# Patient Record
Sex: MALE | Race: WHITE | NOT HISPANIC OR LATINO | URBAN - METROPOLITAN AREA
[De-identification: names, ages, dates, MRNs, and addresses within clinical notes are randomized per-mention and may not be internally consistent; named-entity substitution may affect disease eponyms.]

---

## 2022-07-21 ENCOUNTER — CLAIMS CREATED FROM THE CLAIM WINDOW (OUTPATIENT)
Dept: URBAN - METROPOLITAN AREA CLINIC 54 | Facility: CLINIC | Age: 77
End: 2022-07-21
Payer: MEDICARE

## 2022-07-21 ENCOUNTER — WEB ENCOUNTER (OUTPATIENT)
Dept: URBAN - METROPOLITAN AREA CLINIC 54 | Facility: CLINIC | Age: 77
End: 2022-07-21

## 2022-07-21 VITALS
SYSTOLIC BLOOD PRESSURE: 155 MMHG | DIASTOLIC BLOOD PRESSURE: 74 MMHG | TEMPERATURE: 97.2 F | HEART RATE: 57 BPM | BODY MASS INDEX: 25.62 KG/M2 | WEIGHT: 179 LBS | HEIGHT: 70 IN

## 2022-07-21 DIAGNOSIS — Z86.2 HISTORY OF THROMBOCYTOPENIA: ICD-10-CM

## 2022-07-21 DIAGNOSIS — R03.0 ELEVATED BP WITHOUT DIAGNOSIS OF HYPERTENSION: ICD-10-CM

## 2022-07-21 DIAGNOSIS — R93.2 ABNORMAL LIVER ULTRASOUND: ICD-10-CM

## 2022-07-21 PROCEDURE — 91200 LIVER ELASTOGRAPHY: CPT

## 2022-07-21 PROCEDURE — 99204 OFFICE O/P NEW MOD 45 MIN: CPT

## 2022-07-21 PROCEDURE — 99244 OFF/OP CNSLTJ NEW/EST MOD 40: CPT

## 2022-07-21 RX ORDER — CITALOPRAM 20 MG/1
1 TABLET TABLET, FILM COATED ORAL ONCE A DAY
Status: ACTIVE | COMMUNITY

## 2022-07-21 NOTE — HPI-TODAY'S VISIT:
7/21/22: Patient was referred by Dr. Eddie Negro for abnormal liver US. A copy of this document will be sent to the physician. Pt is a healthy 77 yo male with PMH of HLD, IBS, and thrombocytopenia who presents for abnormal liver US. Feeling well overall with. Does reports infrequent, mild RUQ tenderness for the last year. Denies ascites, LE edema, jaundice, PSE, easy bruising/bleeding, nausea, vomiting, change in BMs, hematochezia, or diarrhea. Appetite is normal and pt has no unintentional weight loss. Liver enzymes are normal. Pt was told he had may have had a Hep C infection in the past, but did not have Hep C. Pt does not drink alcohol or use tobacco products. Does not overuse Tylenol. Takes several herbals - juice plus, saw palmetto, pygeum, CoQ 10, milk thistle, red yeast rice, and spirulina. Last cscope was 2020 to repeat in 5 years.  Labs 6/22/22: AST 22, ALT 13, ALP 89, TB 1.0, albumin 3.9. Most recent Plt count was 141 from 2/8/22.  RUQ US 7/8/22: Surgically absent gallbladder. Coarsened hepatic echotexture with increased echogenicity as described.

## 2022-07-26 LAB
A/G RATIO: 1.9
ABSOLUTE BASOPHILS: 38
ABSOLUTE EOSINOPHILS: 0
ABSOLUTE LYMPHOCYTES: 646
ABSOLUTE MONOCYTES: 418
ABSOLUTE NEUTROPHILS: 2698
ACTIN (SMOOTH MUSCLE) ANTIBODY (IGG): <20
AFP, SERUM, TUMOR MARKER: 2.8
ALBUMIN: 4.5
ALKALINE PHOSPHATASE: 80
ALT (SGPT): 11
ANA SCREEN, IFA: NEGATIVE
AST (SGOT): 18
BASOPHILS: 1
BILIRUBIN, TOTAL: 0.9
BUN/CREATININE RATIO: (no result)
BUN: 11
CALCIUM: 9.4
CARBON DIOXIDE, TOTAL: 28
CERULOPLASMIN: 26
CHLORIDE: 101
CREATININE: 0.81
EGFR: 93
EOSINOPHILS: 0
FERRITIN, SERUM: 83
GLOBULIN, TOTAL: 2.4
GLUCOSE: 93
HBSAG SCREEN: (no result)
HEMATOCRIT: 45.4
HEMOGLOBIN: 15.5
HEP A AB, IGM: (no result)
HEP B CORE AB, IGM: (no result)
HEP C VIRUS AB: 0.01
HEPATITIS C ANTIBODY: (no result)
IMMUNOGLOBULIN A, QN, SERUM: 277
INR: 1
IRON BIND.CAP.(TIBC): 324
IRON SATURATION: 41
IRON: 132
LYMPHOCYTES: 17
MCH: 31.1
MCHC: 34.1
MCV: 91.2
MITOCHONDRIAL (M2) ANTIBODY: (no result)
MONOCYTES: 11
MPV: 11.7
NEUTROPHILS: 71
NOTE: (no result)
PLATELET COUNT: 129
POTASSIUM: 4.6
PROTEIN, TOTAL: 6.9
PT: 10.3
RDW: 12.3
RED BLOOD CELL COUNT: 4.98
RHEUMATOID FACTOR: <14
SJOGREN'S ANTIBODY (SS-A): (no result)
SJOGREN'S ANTIBODY (SS-B): (no result)
SODIUM: 136
WHITE BLOOD CELL COUNT: 3.8

## 2022-08-05 ENCOUNTER — TELEPHONE ENCOUNTER (OUTPATIENT)
Dept: URBAN - METROPOLITAN AREA CLINIC 118 | Facility: CLINIC | Age: 77
End: 2022-08-05

## 2022-09-12 ENCOUNTER — DASHBOARD ENCOUNTERS (OUTPATIENT)
Age: 77
End: 2022-09-12

## 2022-09-12 ENCOUNTER — OFFICE VISIT (OUTPATIENT)
Dept: URBAN - METROPOLITAN AREA CLINIC 54 | Facility: CLINIC | Age: 77
End: 2022-09-12
Payer: MEDICARE

## 2022-09-12 VITALS
BODY MASS INDEX: 25.77 KG/M2 | TEMPERATURE: 97.6 F | SYSTOLIC BLOOD PRESSURE: 141 MMHG | WEIGHT: 180 LBS | HEIGHT: 70 IN | DIASTOLIC BLOOD PRESSURE: 74 MMHG | HEART RATE: 52 BPM

## 2022-09-12 DIAGNOSIS — Z86.2 HISTORY OF THROMBOCYTOPENIA: ICD-10-CM

## 2022-09-12 DIAGNOSIS — K76.0 FATTY LIVER: ICD-10-CM

## 2022-09-12 DIAGNOSIS — R93.2 ABNORMAL LIVER ULTRASOUND: ICD-10-CM

## 2022-09-12 DIAGNOSIS — R03.0 ELEVATED BP WITHOUT DIAGNOSIS OF HYPERTENSION: ICD-10-CM

## 2022-09-12 PROBLEM — 197321007: Status: ACTIVE | Noted: 2022-09-12

## 2022-09-12 PROBLEM — 49341000119108: Status: ACTIVE | Noted: 2022-07-21

## 2022-09-12 PROCEDURE — 99214 OFFICE O/P EST MOD 30 MIN: CPT | Performed by: INTERNAL MEDICINE

## 2022-09-12 RX ORDER — CITALOPRAM 20 MG/1
1 TABLET TABLET, FILM COATED ORAL ONCE A DAY
Status: ACTIVE | COMMUNITY

## 2022-09-12 NOTE — PHYSICAL EXAM CHEST:
no lesions,  no deformities,  no traumatic injuries,  mild spiders on chest no significant scars are present,  chest wall non-tender,  no masses present, breathing is unlabored without accessory muscle use,normal breath sounds

## 2022-09-12 NOTE — HPI-TODAY'S VISIT:
7/21/22: Patient was referred by Dr. Eddie Negro for abnormal liver US. A copy of this document will be sent to the physician. Pt is a healthy 75 yo male with PMH of HLD, IBS, and thrombocytopenia who presents for abnormal liver US. Feeling well overall with. Does reports infrequent, mild RUQ tenderness for the last year. Denies ascites, LE edema, jaundice, PSE, easy bruising/bleeding, nausea, vomiting, change in BMs, hematochezia, or diarrhea. Appetite is normal and pt has no unintentional weight loss. Liver enzymes are normal. Pt was told he had may have had a Hep C infection in the past, but did not have Hep C. Pt does not drink alcohol or use tobacco products. Does not overuse Tylenol. Takes several herbals - juice plus, saw palmetto, pygeum, CoQ 10, milk thistle, red yeast rice, and spirulina. Last cscope was 2020 to repeat in 5 years.  Labs 6/22/22: AST 22, ALT 13, ALP 89, TB 1.0, albumin 3.9. Most recent Plt count was 141 from 2/8/22.  RUQ US 7/8/22: Surgically absent gallbladder. Coarsened hepatic echotexture with increased echogenicity as described.   Follow Up 9/12/22: Patient presents for routine follow up. Liver secondary workup was negative. PLTs noted at 129. 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 He has lost 7 lbs intentionally. He has a mild RUQ discomfort. He denies HTN but has borderline HLD.

## 2022-10-11 ENCOUNTER — TELEPHONE ENCOUNTER (OUTPATIENT)
Dept: URBAN - METROPOLITAN AREA CLINIC 54 | Facility: CLINIC | Age: 77
End: 2022-10-11

## 2023-03-29 ENCOUNTER — TELEPHONE ENCOUNTER (OUTPATIENT)
Dept: URBAN - METROPOLITAN AREA CLINIC 54 | Facility: CLINIC | Age: 78
End: 2023-03-29

## 2024-12-12 ENCOUNTER — LAB OUTSIDE AN ENCOUNTER (OUTPATIENT)
Dept: URBAN - METROPOLITAN AREA CLINIC 54 | Facility: CLINIC | Age: 79
End: 2024-12-12

## 2024-12-12 ENCOUNTER — OFFICE VISIT (OUTPATIENT)
Dept: URBAN - METROPOLITAN AREA CLINIC 54 | Facility: CLINIC | Age: 79
End: 2024-12-12
Payer: MEDICARE

## 2024-12-12 VITALS
HEIGHT: 70 IN | BODY MASS INDEX: 26.63 KG/M2 | DIASTOLIC BLOOD PRESSURE: 76 MMHG | WEIGHT: 186 LBS | TEMPERATURE: 97.4 F | HEART RATE: 52 BPM | SYSTOLIC BLOOD PRESSURE: 151 MMHG

## 2024-12-12 DIAGNOSIS — Z86.0100 HISTORY OF COLON POLYPS: ICD-10-CM

## 2024-12-12 DIAGNOSIS — Z86.2 HISTORY OF THROMBOCYTOPENIA: ICD-10-CM

## 2024-12-12 DIAGNOSIS — K76.0 FATTY LIVER: ICD-10-CM

## 2024-12-12 DIAGNOSIS — Z80.0 FAMILY HISTORY OF COLON CANCER: ICD-10-CM

## 2024-12-12 DIAGNOSIS — I48.91 ATRIAL FIBRILLATION, UNSPECIFIED TYPE: ICD-10-CM

## 2024-12-12 PROBLEM — 49436004: Status: ACTIVE | Noted: 2024-12-12

## 2024-12-12 PROCEDURE — 99213 OFFICE O/P EST LOW 20 MIN: CPT

## 2024-12-12 RX ORDER — SOD SULF/POT CHLORIDE/MAG SULF 1.479 G
12 TABLETS THE FIRST DOSE THE EVENING BEFORE AND SECOND DOSE THE MORNING OF COLONOSCOPY TABLET ORAL TWICE A DAY
Qty: 24 | Refills: 0 | OUTPATIENT
Start: 2024-12-12 | End: 2024-12-13

## 2024-12-12 RX ORDER — APIXABAN 5 MG/1
AS DIRECTED TABLET, FILM COATED ORAL
Status: ACTIVE | COMMUNITY

## 2024-12-12 RX ORDER — CITALOPRAM 20 MG/1
1 TABLET TABLET, FILM COATED ORAL ONCE A DAY
Status: ACTIVE | COMMUNITY

## 2024-12-12 RX ORDER — TADALAFIL 5 MG/1
1 TABLET AS NEEDED TABLET, FILM COATED ORAL ONCE A DAY
Status: ACTIVE | COMMUNITY

## 2024-12-12 RX ORDER — FLECAINIDE ACETATE 50 MG/1
AS DIRECTED TABLET ORAL
Status: ACTIVE | COMMUNITY

## 2024-12-12 NOTE — HPI-TODAY'S VISIT:
7/21/22: Patient was referred by Dr. Eddie Negro for abnormal liver US. A copy of this document will be sent to the physician. Pt is a healthy 75 yo male with PMH of HLD, IBS, and thrombocytopenia who presents for abnormal liver US. Feeling well overall with. Does reports infrequent, mild RUQ tenderness for the last year. Denies ascites, LE edema, jaundice, PSE, easy bruising/bleeding, nausea, vomiting, change in BMs, hematochezia, or diarrhea. Appetite is normal and pt has no unintentional weight loss. Liver enzymes are normal. Pt was told he had may have had a Hep C infection in the past, but did not have Hep C. Pt does not drink alcohol or use tobacco products. Does not overuse Tylenol. Takes several herbals - juice plus, saw palmetto, pygeum, CoQ 10, milk thistle, red yeast rice, and spirulina. Last cscope was 2020 to repeat in 5 years.  Labs 6/22/22: AST 22, ALT 13, ALP 89, TB 1.0, albumin 3.9. Most recent Plt count was 141 from 2/8/22.  RUQ US 7/8/22: Surgically absent gallbladder. Coarsened hepatic echotexture with increased echogenicity as described.   Follow Up 9/12/22: Patient presents for routine follow up. Liver secondary workup was negative. PLTs noted at 129. 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 He has lost 7 lbs intentionally. He has a mild RUQ discomfort. He denies HTN but has borderline HLD.  12/12/24: Patient RTC for surveillance colonoscopy. Last cscope was negative in Jan 2020, but he has a hx of colon polyps and a family hx of CRC in his brother. Stools have gotten harder recently. Otherwise no complaints. Denies hematochezia, melena, abd pain, or weight loss. On Eliquis for Afib.  Colonoscopy 1/3/2020: - Internal hemorrhoids. - Diverticulosis in the sigmoid colon, in the descending colon, at the splenic flexure, in the transversecolon and at the hepatic flexure. - The examined portion of the ileum was normal. - The examination was otherwise normal. - No specimens collected

## 2025-01-15 ENCOUNTER — TELEPHONE ENCOUNTER (OUTPATIENT)
Dept: URBAN - METROPOLITAN AREA CLINIC 54 | Facility: CLINIC | Age: 80
End: 2025-01-15

## 2025-02-06 ENCOUNTER — CLAIMS CREATED FROM THE CLAIM WINDOW (OUTPATIENT)
Dept: URBAN - METROPOLITAN AREA SURGERY CENTER 14 | Facility: SURGERY CENTER | Age: 80
End: 2025-02-06
Payer: MEDICARE

## 2025-02-06 ENCOUNTER — CLAIMS CREATED FROM THE CLAIM WINDOW (OUTPATIENT)
Dept: URBAN - METROPOLITAN AREA CLINIC 4 | Facility: CLINIC | Age: 80
End: 2025-02-06
Payer: MEDICARE

## 2025-02-06 DIAGNOSIS — D12.2 BENIGN NEOPLASM OF ASCENDING COLON: ICD-10-CM

## 2025-02-06 DIAGNOSIS — Z80.0 FAMILY HISTORY OF COLON CANCER: ICD-10-CM

## 2025-02-06 DIAGNOSIS — K57.30 COLONIC DIVERTICULOSIS: ICD-10-CM

## 2025-02-06 DIAGNOSIS — Z12.11 ENCOUNTER FOR SCREENING FOR MALIGNANT NEOPLASM OF COLON: ICD-10-CM

## 2025-02-06 DIAGNOSIS — Z12.11 COLON CANCER SCREENING: ICD-10-CM

## 2025-02-06 DIAGNOSIS — D12.2 ADENOMA OF ASCENDING COLON: ICD-10-CM

## 2025-02-06 PROCEDURE — 00811 ANES LWR INTST NDSC NOS: CPT | Performed by: NURSE ANESTHETIST, CERTIFIED REGISTERED

## 2025-02-06 PROCEDURE — 88305 TISSUE EXAM BY PATHOLOGIST: CPT | Performed by: PATHOLOGY

## 2025-02-06 PROCEDURE — 45380 COLONOSCOPY AND BIOPSY: CPT | Performed by: CLINIC/CENTER

## 2025-02-06 PROCEDURE — 45380 COLONOSCOPY AND BIOPSY: CPT | Performed by: INTERNAL MEDICINE

## 2025-02-06 RX ORDER — CITALOPRAM 20 MG/1
1 TABLET TABLET, FILM COATED ORAL ONCE A DAY
Status: ACTIVE | COMMUNITY

## 2025-02-06 RX ORDER — TADALAFIL 5 MG/1
1 TABLET AS NEEDED TABLET, FILM COATED ORAL ONCE A DAY
Status: ACTIVE | COMMUNITY

## 2025-02-06 RX ORDER — APIXABAN 5 MG/1
AS DIRECTED TABLET, FILM COATED ORAL
Status: ACTIVE | COMMUNITY

## 2025-02-06 RX ORDER — FLECAINIDE ACETATE 50 MG/1
AS DIRECTED TABLET ORAL
Status: ACTIVE | COMMUNITY

## 2025-02-17 ENCOUNTER — OFFICE VISIT (OUTPATIENT)
Dept: URBAN - METROPOLITAN AREA CLINIC 54 | Facility: CLINIC | Age: 80
End: 2025-02-17
Payer: MEDICARE

## 2025-02-17 VITALS
BODY MASS INDEX: 26.6 KG/M2 | TEMPERATURE: 98.4 F | WEIGHT: 185.8 LBS | HEIGHT: 70 IN | DIASTOLIC BLOOD PRESSURE: 83 MMHG | SYSTOLIC BLOOD PRESSURE: 144 MMHG | HEART RATE: 54 BPM

## 2025-02-17 DIAGNOSIS — K76.0 FATTY LIVER: ICD-10-CM

## 2025-02-17 DIAGNOSIS — I48.91 ATRIAL FIBRILLATION, UNSPECIFIED TYPE: ICD-10-CM

## 2025-02-17 DIAGNOSIS — Z86.2 HISTORY OF THROMBOCYTOPENIA: ICD-10-CM

## 2025-02-17 DIAGNOSIS — Z80.0 FAMILY HISTORY OF COLON CANCER: ICD-10-CM

## 2025-02-17 DIAGNOSIS — Z86.0100 HISTORY OF COLON POLYPS: ICD-10-CM

## 2025-02-17 PROCEDURE — 99212 OFFICE O/P EST SF 10 MIN: CPT

## 2025-02-17 RX ORDER — TADALAFIL 5 MG/1
1 TABLET AS NEEDED TABLET, FILM COATED ORAL ONCE A DAY
Status: ACTIVE | COMMUNITY

## 2025-02-17 RX ORDER — APIXABAN 5 MG/1
AS DIRECTED TABLET, FILM COATED ORAL
Status: ACTIVE | COMMUNITY

## 2025-02-17 RX ORDER — FLECAINIDE ACETATE 50 MG/1
AS DIRECTED TABLET ORAL
Status: ACTIVE | COMMUNITY

## 2025-02-17 RX ORDER — CITALOPRAM 20 MG/1
1 TABLET TABLET, FILM COATED ORAL ONCE A DAY
Status: ACTIVE | COMMUNITY

## 2025-02-17 NOTE — HPI-TODAY'S VISIT:
7/21/22: Patient was referred by Dr. Eddie Negro for abnormal liver US. A copy of this document will be sent to the physician. Pt is a healthy 77 yo male with PMH of HLD, IBS, and thrombocytopenia who presents for abnormal liver US. Feeling well overall with. Does reports infrequent, mild RUQ tenderness for the last year. Denies ascites, LE edema, jaundice, PSE, easy bruising/bleeding, nausea, vomiting, change in BMs, hematochezia, or diarrhea. Appetite is normal and pt has no unintentional weight loss. Liver enzymes are normal. Pt was told he had may have had a Hep C infection in the past, but did not have Hep C. Pt does not drink alcohol or use tobacco products. Does not overuse Tylenol. Takes several herbals - juice plus, saw palmetto, pygeum, CoQ 10, milk thistle, red yeast rice, and spirulina. Last cscope was 2020 to repeat in 5 years.  Labs 6/22/22: AST 22, ALT 13, ALP 89, TB 1.0, albumin 3.9. Most recent Plt count was 141 from 2/8/22.  RUQ US 7/8/22: Surgically absent gallbladder. Coarsened hepatic echotexture with increased echogenicity as described.   Follow Up 9/12/22: Patient presents for routine follow up. Liver secondary workup was negative. PLTs noted at 129. 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 He has lost 7 lbs intentionally. He has a mild RUQ discomfort. He denies HTN but has borderline HLD.  12/12/24: Patient RTC for surveillance colonoscopy. Last cscope was negative in Jan 2020, but he has a hx of colon polyps and a family hx of CRC in his brother. Stools have gotten harder recently. Otherwise no complaints. Denies hematochezia, melena, abd pain, or weight loss. On Eliquis for Afib.  Colonoscopy 1/3/2020: - Internal hemorrhoids. - Diverticulosis in the sigmoid colon, in the descending colon, at the splenic flexure, in the transversecolon and at the hepatic flexure. - The examined portion of the ileum was normal. - The examination was otherwise normal. - No specimens collected  2/17/25: Pt returns for colonoscopy follow up. Detailed below. Five year surveillance recommended. Plans to continue screenings. No complaints.  Colonoscopy 2/6/25: - Two diminutive (1-3 mm) polyps in the ascending colon, removed with a cold biopsy forceps. Resected and retrieved. - Diverticulosis in the sigmoid colon. - Internal hemorrhoids. - The examination was otherwise normal on direct and retroflexion views. Biopsy: TAs.